# Patient Record
Sex: FEMALE | Race: WHITE | NOT HISPANIC OR LATINO | Employment: UNEMPLOYED | URBAN - METROPOLITAN AREA
[De-identification: names, ages, dates, MRNs, and addresses within clinical notes are randomized per-mention and may not be internally consistent; named-entity substitution may affect disease eponyms.]

---

## 2020-09-15 ENCOUNTER — TRANSCRIBE ORDERS (OUTPATIENT)
Dept: ADMINISTRATIVE | Facility: HOSPITAL | Age: 65
End: 2020-09-15

## 2020-09-15 DIAGNOSIS — R94.39 ABNORMAL RESULT OF OTHER CARDIOVASCULAR FUNCTION STUDY: Primary | ICD-10-CM

## 2020-09-21 ENCOUNTER — OUTPATIENT (OUTPATIENT)
Dept: OUTPATIENT SERVICES | Facility: HOSPITAL | Age: 65
LOS: 1 days | Discharge: HOME | End: 2020-09-21
Payer: MEDICARE

## 2020-09-21 DIAGNOSIS — R94.39 ABNORMAL RESULT OF OTHER CARDIOVASCULAR FUNCTION STUDY: ICD-10-CM

## 2020-09-21 PROCEDURE — 75574 CT ANGIO HRT W/3D IMAGE: CPT | Mod: 26

## 2020-10-08 ENCOUNTER — HOSPITAL ENCOUNTER (OUTPATIENT)
Dept: CT IMAGING | Facility: HOSPITAL | Age: 65
Discharge: HOME | End: 2020-10-08

## 2020-10-16 PROBLEM — Z00.00 ENCOUNTER FOR PREVENTIVE HEALTH EXAMINATION: Status: ACTIVE | Noted: 2020-10-16

## 2020-10-19 ENCOUNTER — APPOINTMENT (OUTPATIENT)
Dept: PULMONOLOGY | Facility: CLINIC | Age: 65
End: 2020-10-19
Payer: MEDICARE

## 2020-10-19 ENCOUNTER — LABORATORY RESULT (OUTPATIENT)
Age: 65
End: 2020-10-19

## 2020-10-19 VITALS
SYSTOLIC BLOOD PRESSURE: 116 MMHG | TEMPERATURE: 97.9 F | OXYGEN SATURATION: 96 % | WEIGHT: 191 LBS | DIASTOLIC BLOOD PRESSURE: 69 MMHG | HEART RATE: 94 BPM

## 2020-10-19 DIAGNOSIS — Z86.11 PERSONAL HISTORY OF TUBERCULOSIS: ICD-10-CM

## 2020-10-19 DIAGNOSIS — I10 ESSENTIAL (PRIMARY) HYPERTENSION: ICD-10-CM

## 2020-10-19 DIAGNOSIS — Z78.9 OTHER SPECIFIED HEALTH STATUS: ICD-10-CM

## 2020-10-19 DIAGNOSIS — Z83.3 FAMILY HISTORY OF DIABETES MELLITUS: ICD-10-CM

## 2020-10-19 DIAGNOSIS — E78.00 PURE HYPERCHOLESTEROLEMIA, UNSPECIFIED: ICD-10-CM

## 2020-10-19 DIAGNOSIS — E11.9 TYPE 2 DIABETES MELLITUS W/OUT COMPLICATIONS: ICD-10-CM

## 2020-10-19 PROCEDURE — 99203 OFFICE O/P NEW LOW 30 MIN: CPT

## 2020-10-19 RX ORDER — ROSUVASTATIN CALCIUM 40 MG/1
40 TABLET, FILM COATED ORAL
Qty: 90 | Refills: 0 | Status: ACTIVE | COMMUNITY
Start: 2019-10-12

## 2020-10-19 RX ORDER — METOPROLOL TARTRATE 50 MG/1
50 TABLET, FILM COATED ORAL
Qty: 2 | Refills: 0 | Status: ACTIVE | COMMUNITY
Start: 2020-09-16

## 2020-10-19 RX ORDER — OLMESARTAN MEDOXOMIL AND HYDROCHLOROTHIAZIDE 40; 25 MG/1; MG/1
40-25 TABLET ORAL
Qty: 90 | Refills: 0 | Status: ACTIVE | COMMUNITY
Start: 2020-10-07

## 2020-10-19 NOTE — HISTORY OF PRESENT ILLNESS
[Never] : never [Former] : former [TextBox_4] : 64yo with recent dx of DM, HTN, HLD, and CKD referred for evaluation of pulmonary issues. Has been having progressive SOB for about 2 years. Stopped smoking in 2018; 1 pack per day since age 15. Also had TB in 1976; DOT x 3 months; no contacts at that time. As part of workup for SOB she had a cardiac CT which showed some lung abnormalities. Thinks all of her issues are secondary to stress. Has 5 finches for at least 20 years. Has symptoms with minimal walking. 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 Is from [place] but no known environmental exposures from there. New dx of DM, CKD. Was told she has asthma; has been on Breo x 2 years with no difference. Never had PFTs or CT chest. No lung related hospitalizations. No history of recurrent pneumonias. [TextBox_13] : 42 [TextBox_11] : 1 [YearQuit] : 2018

## 2020-10-19 NOTE — PHYSICAL EXAM
[Normal Oropharynx] : normal oropharynx [No Acute Distress] : no acute distress [Normal Rate/Rhythm] : normal rate/rhythm [No Neck Mass] : no neck mass [Normal Appearance] : normal appearance [Normal S1, S2] : normal s1, s2 [No Murmurs] : no murmurs [Clear to Auscultation Bilaterally] : clear to auscultation bilaterally [No Abnormalities] : no abnormalities [No Resp Distress] : no resp distress [Benign] : benign [No Cyanosis] : no cyanosis [No Clubbing] : no clubbing [Normal Gait] : normal gait [FROM] : FROM [No Edema] : no edema [No Focal Deficits] : no focal deficits [Normal Color/ Pigmentation] : normal color/ pigmentation [Oriented x3] : oriented x3 [Normal Affect] : normal affect

## 2020-10-19 NOTE — ASSESSMENT
[FreeTextEntry1] : 66yo with recent dx of DM, HTN, HLD, and CKD referred for evaluation of pulmonary issues. Has been having progressive COLON for about 2 years. Never had a full workup. Cardiac workup led to a CTA which showed bronchial wall thickening, GGO, atelectasis, and minimal cardiac disease. CXR also showed BRAYDEN pleural thickening. Differential for Ms. Wu's COLON/SOB is wide including COPD versus HP (has birds for years) versus ILD versus non infectious pneumonias versus malignancy. She is also a former heavy smoker and is eligible for the lung cancer screening program. Further lung imaging is indicated given the abnormalities noted on the CXR and cardiac CTA. She was referred to Jigna [facility] for the LDCT scheduling and screening program enrollment. Will also get PFTs and 6MWT to assess baseline lung function. I will not adjust her bronchodilators as I am not sure of what the etiology of her COLON is.\par \par Of note Ms. Wu is indicated for sleep apnea testing given recent diagnosis of metabolic comorbidities. Will defer this discussion until the pulmonary workup is complete.\par \par Ms. Wu will follow up after the LDCT, PFTs, and 6MWT are resulted.

## 2020-10-20 ENCOUNTER — NON-APPOINTMENT (OUTPATIENT)
Age: 65
End: 2020-10-20

## 2020-10-21 ENCOUNTER — APPOINTMENT (OUTPATIENT)
Dept: PULMONOLOGY | Facility: CLINIC | Age: 65
End: 2020-10-21
Payer: MEDICARE

## 2020-10-21 VITALS — HEIGHT: 63 IN | BODY MASS INDEX: 33.84 KG/M2 | WEIGHT: 191 LBS

## 2020-10-21 PROCEDURE — G0296 VISIT TO DETERM LDCT ELIG: CPT

## 2020-10-21 NOTE — REASON FOR VISIT
[Home] : at home, [unfilled] , at the time of the visit. [Medical Office: ([facility])___] : at the medical office located in  [Verbal consent obtained from patient] : the patient, [unfilled] [Initial Evaluation] : an initial evaluation visit [Review of Eligibility] : review of eligibility [Low-Dose CT Screening Discussion] : low-dose CT lung cancer screening discussion

## 2020-10-21 NOTE — PLAN
[Smoking Cessation] : smoking cessation [FreeTextEntry1] : Plan:\par -Low Dose CT chest for lung cancer screening\par -Follow up with patient and her referring provider after her LDCT results have been reviewed by the multi-disciplinary clinical team\par -Encouraged continued smoking abstinence\par \par Engaged in shared decision making with Ms. CHIDI DOSS . Answered all questions. She verbalized understanding and agreement. She knows to call back with any questions or concerns

## 2020-10-21 NOTE — HISTORY OF PRESENT ILLNESS
[Former] : former smoker [_____ pack-years] : [unfilled] pack-years [TextBox_13] : Polish interpretation provided by granddaughter Katelyn\par \par Referred by Dr. Newton\par \par Ms. CHIDI DOSS  is a 65 year old woman with a history of HTN, COLON, DM, TB\par \par She  was seen in the office by Dr. Newton for review of eligibility for, as well as, discussion of Low-Dose CT lung cancer screening program. Over the telephone today we reviewed and confirmed that the patient meets screening eligibility criteria:\par -Age: 65 year \par Smoking status:\par -Former smoker\par -Number of pack(s) per day: 1\par -Number of year smoked: 48\par -Number of pack years smokin\par -Number of years since quitting smokin\par -Quit year: \par \par Had TB 45 years ago 3 mos in the hospital in [place]. Likely BRAYDEN\par She has 6 finches as pets\par \par Ms. DOSS denies any signs or symptoms of lung cancer including new cough, change in cough, hemoptysis and unintentional weight loss. \par \par Ms. DOSS denies any personal history of lung cancer. No lung cancer in a 1st degree relative. Denies any history of lung disease. 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

## 2020-10-22 ENCOUNTER — NON-APPOINTMENT (OUTPATIENT)
Age: 65
End: 2020-10-22

## 2020-10-22 ENCOUNTER — APPOINTMENT (OUTPATIENT)
Dept: CT IMAGING | Facility: CLINIC | Age: 65
End: 2020-10-22
Payer: MEDICARE

## 2020-10-22 ENCOUNTER — RESULT REVIEW (OUTPATIENT)
Age: 65
End: 2020-10-22

## 2020-10-22 ENCOUNTER — APPOINTMENT (OUTPATIENT)
Dept: PULMONOLOGY | Facility: CLINIC | Age: 65
End: 2020-10-22
Payer: MEDICARE

## 2020-10-22 ENCOUNTER — OUTPATIENT (OUTPATIENT)
Dept: OUTPATIENT SERVICES | Facility: HOSPITAL | Age: 65
LOS: 1 days | End: 2020-10-22

## 2020-10-22 PROCEDURE — 94726 PLETHYSMOGRAPHY LUNG VOLUMES: CPT

## 2020-10-22 PROCEDURE — 94060 EVALUATION OF WHEEZING: CPT

## 2020-10-22 PROCEDURE — 94618 PULMONARY STRESS TESTING: CPT

## 2020-10-22 PROCEDURE — G0297: CPT | Mod: 26

## 2020-10-22 PROCEDURE — 94729 DIFFUSING CAPACITY: CPT

## 2020-10-25 ENCOUNTER — TRANSCRIPTION ENCOUNTER (OUTPATIENT)
Age: 65
End: 2020-10-25

## 2020-10-26 ENCOUNTER — NON-APPOINTMENT (OUTPATIENT)
Age: 65
End: 2020-10-26

## 2020-10-28 ENCOUNTER — APPOINTMENT (OUTPATIENT)
Dept: PULMONOLOGY | Facility: CLINIC | Age: 65
End: 2020-10-28
Payer: MEDICARE

## 2020-10-28 PROCEDURE — 99443: CPT | Mod: 95

## 2020-10-28 RX ORDER — FUROSEMIDE 20 MG/1
20 TABLET ORAL
Qty: 30 | Refills: 0 | Status: ACTIVE | COMMUNITY
Start: 2020-10-20

## 2020-10-28 RX ORDER — NYSTATIN 100000 [USP'U]/ML
100000 SUSPENSION ORAL
Qty: 480 | Refills: 0 | Status: ACTIVE | COMMUNITY
Start: 2020-07-07

## 2020-10-28 RX ORDER — MECLIZINE HCL 25 MG/1
25 TABLET, CHEWABLE ORAL
Qty: 90 | Refills: 0 | Status: ACTIVE | COMMUNITY
Start: 2020-07-01

## 2020-10-28 NOTE — HISTORY OF PRESENT ILLNESS
[Home] : at home, [unfilled] , at the time of the visit. [Medical Office: ([facility])___] : at the medical office located in  [Former] : former [Never] : never [TextBox_4] : 64yo with recent dx of DM, HTN, HLD, and CKD referred for evaluation of pulmonary issues. Has been having progressive SOB for about 2 years. Stopped smoking in 2018; 1 pack per day since age 15. Also had TB in 1976; DOT x 3 months; no contacts at that time. As part of workup for SOB she had a cardiac CT which showed some lung abnormalities. Thinks all of her issues are secondary to stress. Has 5 finches for at least 20 years. Has symptoms with minimal walking. 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 Is from [place] but no known environmental exposures from there. New dx of DM, CKD. Was told she has asthma; has been on Breo x 2 years with no difference. Never had PFTs or CT chest. No lung related hospitalizations. No history of recurrent pneumonias.\par \par 10/28/2020\par Following up after CT chest and PFTs. [TextBox_11] : 1 [TextBox_13] : 42 [YearQuit] : 2018 [Lung Cancer Screening] : Patient underwent lung cancer screening [3] : 3 [TextBox_12] : 10/2020

## 2020-10-28 NOTE — ASSESSMENT
[FreeTextEntry1] : 66yo with recent dx of DM, HTN, HLD, and CKD. hx of treated TB as a child. Has COLON and SOB. PFTs showed severe obstructive airway disease with mild reduction in DLCO. No desaturation noted on 6MWT but reduced walk distance. LDCT showed a BRAYDEN opacity with bronchiectasis/bronchial wall thickening/apical scarring (Lung RADS 3): this may be secondary to residual tuberculous disease versus FREDERICK versus COPD (most likely all three). Will start with ICS/LABA 2puffs BID; granddaughter will show proper technique to the patient. Will need to repeat LDCT in 3 months to ensure stability of BRAYDEN opactity. At next follow up, in 4 weeks, will check IgE, CBC/diff, and HP panel.

## 2020-10-28 NOTE — PROCEDURE
[FreeTextEntry1] : Compared to coronary CT, 9/2020\par LDCT from 10/22/2020:\par *bronchiectasis\par *peribronchial wall thickening\par *BRAYDEN opacity\par *micronodular opacity in BRAYDEN\par *biapical pleural, BRAYDEN/lingula/LLL scarring vs atelectasis\par *granuloma in lingula\par \par PFTs from 10/22/2020\par FEV1 48\par FEV1/FVC 54\par \par DLCO 67\par *severe obstruction, hyperinflation, and mild reduction in diffusion\par \par 6MWT from 10/22/2020\par pre/post HR 94/112\par pre/post saturation 95/94% \par meters walked 180\par *reduced walk distance

## 2020-10-30 ENCOUNTER — TRANSCRIPTION ENCOUNTER (OUTPATIENT)
Age: 65
End: 2020-10-30

## 2020-10-30 RX ORDER — FLUTICASONE PROPIONATE AND SALMETEROL 250; 50 UG/1; UG/1
250-50 POWDER RESPIRATORY (INHALATION)
Qty: 1 | Refills: 0 | Status: DISCONTINUED | COMMUNITY
Start: 2020-10-28 | End: 2020-10-30

## 2020-11-02 RX ORDER — FLUTICASONE FUROATE AND VILANTEROL TRIFENATATE 200; 25 UG/1; UG/1
200-25 POWDER RESPIRATORY (INHALATION)
Qty: 1 | Refills: 3 | Status: ACTIVE | COMMUNITY
Start: 2020-10-30 | End: 1900-01-01

## 2020-11-11 ENCOUNTER — TRANSCRIPTION ENCOUNTER (OUTPATIENT)
Age: 65
End: 2020-11-11

## 2020-12-15 ENCOUNTER — TRANSCRIPTION ENCOUNTER (OUTPATIENT)
Age: 65
End: 2020-12-15

## 2021-05-04 ENCOUNTER — APPOINTMENT (OUTPATIENT)
Dept: CT IMAGING | Facility: CLINIC | Age: 66
End: 2021-05-04

## 2021-06-24 ENCOUNTER — OUTPATIENT (OUTPATIENT)
Dept: OUTPATIENT SERVICES | Facility: HOSPITAL | Age: 66
LOS: 1 days | End: 2021-06-24
Payer: MEDICARE

## 2021-06-24 ENCOUNTER — APPOINTMENT (OUTPATIENT)
Dept: CT IMAGING | Facility: HOSPITAL | Age: 66
End: 2021-06-24

## 2021-06-24 ENCOUNTER — RESULT REVIEW (OUTPATIENT)
Age: 66
End: 2021-06-24

## 2021-06-24 PROCEDURE — 71250 CT THORAX DX C-: CPT

## 2021-06-24 PROCEDURE — 71250 CT THORAX DX C-: CPT | Mod: 26,MH

## 2021-06-29 ENCOUNTER — NON-APPOINTMENT (OUTPATIENT)
Age: 66
End: 2021-06-29

## 2021-07-16 ENCOUNTER — APPOINTMENT (OUTPATIENT)
Dept: PULMONOLOGY | Facility: CLINIC | Age: 66
End: 2021-07-16
Payer: MEDICARE

## 2021-07-16 PROCEDURE — 99443: CPT | Mod: 95

## 2021-07-16 NOTE — HISTORY OF PRESENT ILLNESS
[Home] : at home, [unfilled] , at the time of the visit. [Medical Office: ([facility])___] : at the medical office located in  [Verbal consent obtained from patient] : the patient, [unfilled] [Former] : former [Never] : never [Lung Cancer Screening] : Patient underwent lung cancer screening [3] : 3 [TextBox_4] : 66yo with recent dx of DM, HTN, HLD, and CKD referred for evaluation of pulmonary issues. Has been having progressive SOB for about 2 years. Stopped smoking in 2018; 1 pack per day since age 15. Also had TB in 1976; DOT x 3 months; no contacts at that time. As part of workup for SOB she had a cardiac CT which showed some lung abnormalities. Thinks all of her issues are secondary to stress. Has 5 finches for at least 20 years. Has symptoms with minimal walking. 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 Is from [place] but no known environmental exposures from there. New dx of DM, CKD. Was told she has asthma; has been on Breo x 2 years with no difference. Never had PFTs or CT chest. No lung related hospitalizations. No history of recurrent pneumonias.\par \par 10/28/2020\par Following up after CT chest and PFTs.\par \par 7/16/2021\par Spoke to granddaughter. Had LDCT as part of lung cancer screening program. Doing okay. Using inhalers as needed. No exacerbations.  [TextBox_11] : 1 [TextBox_13] : 42 [YearQuit] : 2018 [2] : 2 [TextBox_12] : 10/2020 [TextBox_27] : 6/2021

## 2021-10-06 PROBLEM — I10 ESSENTIAL HYPERTENSION: Status: ACTIVE | Noted: 2020-10-19

## 2022-07-08 ENCOUNTER — HOSPITAL ENCOUNTER (OUTPATIENT)
Dept: CT IMAGING | Facility: CLINIC | Age: 67
Discharge: HOME | End: 2022-07-08
Payer: MEDICARE

## 2022-07-08 DIAGNOSIS — J44.9 CHRONIC OBSTRUCTIVE PULMONARY DISEASE, UNSPECIFIED (HCC): ICD-10-CM

## 2022-07-08 PROCEDURE — 71271 CT THORAX LUNG CANCER SCR C-: CPT

## 2022-07-11 ENCOUNTER — APPOINTMENT (OUTPATIENT)
Dept: ULTRASOUND IMAGING | Facility: HOSPITAL | Age: 67
End: 2022-07-11

## 2022-07-11 ENCOUNTER — OUTPATIENT (OUTPATIENT)
Dept: OUTPATIENT SERVICES | Facility: HOSPITAL | Age: 67
LOS: 1 days | End: 2022-07-11
Payer: MEDICARE

## 2022-07-11 PROCEDURE — 76604 US EXAM CHEST: CPT

## 2022-07-11 PROCEDURE — 76604 US EXAM CHEST: CPT | Mod: 26

## 2022-07-22 ENCOUNTER — TRANSCRIPTION ENCOUNTER (OUTPATIENT)
Age: 67
End: 2022-07-22

## 2022-07-29 ENCOUNTER — OUTPATIENT (OUTPATIENT)
Dept: OUTPATIENT SERVICES | Facility: HOSPITAL | Age: 67
LOS: 1 days | End: 2022-07-29
Payer: MEDICARE

## 2022-07-29 ENCOUNTER — APPOINTMENT (OUTPATIENT)
Dept: NUCLEAR MEDICINE | Facility: HOSPITAL | Age: 67
End: 2022-07-29

## 2022-07-29 LAB — GLUCOSE BLDC GLUCOMTR-MCNC: 99 MG/DL — SIGNIFICANT CHANGE UP (ref 70–99)

## 2022-07-29 PROCEDURE — 82962 GLUCOSE BLOOD TEST: CPT

## 2022-07-29 PROCEDURE — 78815 PET IMAGE W/CT SKULL-THIGH: CPT | Mod: 26,PI,MH

## 2022-07-29 PROCEDURE — A9552: CPT

## 2022-07-29 PROCEDURE — 78815 PET IMAGE W/CT SKULL-THIGH: CPT

## 2022-08-02 ENCOUNTER — TRANSCRIPTION ENCOUNTER (OUTPATIENT)
Age: 67
End: 2022-08-02

## 2022-08-02 DIAGNOSIS — J18.9 PNEUMONIA, UNSPECIFIED ORGANISM: ICD-10-CM

## 2022-08-02 RX ORDER — AZITHROMYCIN 250 MG/1
250 TABLET, FILM COATED ORAL
Qty: 1 | Refills: 0 | Status: ACTIVE | COMMUNITY
Start: 2022-08-02 | End: 1900-01-01

## 2022-09-02 ENCOUNTER — APPOINTMENT (OUTPATIENT)
Dept: PULMONOLOGY | Facility: CLINIC | Age: 67
End: 2022-09-02

## 2022-09-02 DIAGNOSIS — Z87.891 PERSONAL HISTORY OF NICOTINE DEPENDENCE: ICD-10-CM

## 2022-09-02 DIAGNOSIS — R91.1 SOLITARY PULMONARY NODULE: ICD-10-CM

## 2022-09-02 PROCEDURE — 99443: CPT | Mod: 95

## 2022-09-02 NOTE — HISTORY OF PRESENT ILLNESS
[Former] : former [Never] : never [Lung Cancer Screening] : Patient underwent lung cancer screening [3] : 3 [2] : 2 [Home] : at home, [unfilled] , at the time of the visit. [Medical Office: ([facility])___] : at the medical office located in  [TextBox_4] : 66yo with recent dx of DM, HTN, HLD, and CKD referred for evaluation of pulmonary issues. Has been having progressive SOB for about 2 years. Stopped smoking in 2018; 1 pack per day since age 15. Also had TB in 1976; DOT x 3 months; no contacts at that time. As part of workup for SOB she had a cardiac CT which showed some lung abnormalities. Thinks all of her issues are secondary to stress. Has 5 finches for at least 20 years. Has symptoms with minimal walking. 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 Is from [place] but no known environmental exposures from there. New dx of DM, CKD. Was told she has asthma; has been on Breo x 2 years with no difference. Never had PFTs or CT chest. No lung related hospitalizations. No history of recurrent pneumonias.\par \par 10/28/2020\par Following up after CT chest and PFTs.\par \par 7/16/2021\par Spoke to granddaughter. Had LDCT as part of lung cancer screening program. Doing okay. Using inhalers as needed. No exacerbations. \par \par 9/2/2022\par Would like to discuss PET/CT. Generally has been feeling well. [TextBox_11] : 1 [TextBox_13] : 42 [YearQuit] : 2018 [ESS] : 3 [TextBox_12] : 10/2020 [TextBox_27] : 6/2021 [TextBox_57] : PET/CT 7/29/2022

## 2022-09-02 NOTE — ASSESSMENT
[FreeTextEntry1] : \par REVIEWED\par 1. LDCT Short term follow up 6/24/2021\par compared to 10/22/2020\par No change in BRAYDEN opacity\par Lung RADS2\par 2.  PET/CT 7/2022: new FDG avid pleural apical scarring; RLL scarring; RML and RUL TIBs, new FDG avid 11x13 part solid RML nodule\par \par \par \par 66yo withDM, HTN, HLD, and CKD. hx of treated TB as a child. Has severe obstructive airway disease with mild reduction in DLCO. No desaturation noted on 6MWT but reduced walk distance. LDCT in 10/2020 showed a BRAYDEN opacity with bronchiectasis/bronchial wall thickening/apical scarring (Lung RADS 3): repeat short term LDCT follow up in 6/2021 with no change in the BRAYDEN opacity. As per guidelines and multidisciplinary team discussion follow up in 1 year in July 2022 was recommended. Her PCP did a CT chest at an outside facility in mid May/June which showed new nodules (report not obtained; CD imaging not obtained but is pending as per the granddaughter) and this led to a PET/CT which showed a new RML part solid nodule which was not present on June 2021 imaging. We opted for a 6 week imaging follow up and a short course of Zpack treatment. Six week follow up point will be in middle of September. Follow up after imaging is done. \par

## 2022-10-26 ENCOUNTER — NON-APPOINTMENT (OUTPATIENT)
Age: 67
End: 2022-10-26

## 2022-10-26 VITALS — WEIGHT: 191 LBS | HEIGHT: 63 IN | BODY MASS INDEX: 33.84 KG/M2

## 2022-10-26 DIAGNOSIS — Z87.891 PERSONAL HISTORY OF NICOTINE DEPENDENCE: ICD-10-CM

## 2022-10-26 NOTE — REASON FOR VISIT
[Home] : at home, [unfilled] , at the time of the visit. [Medical Office: ([facility])___] : at the medical office located in  [Verbal consent obtained from patient] : the patient, [unfilled] [Annual Follow-Up] : an annual follow-up visit [Review of Eligibility] : review of eligibility [Low-Dose CT Screening Discussion] : low-dose CT lung cancer screening discussion [Virtual Visit] : virtual visit

## 2022-10-26 NOTE — HISTORY OF PRESENT ILLNESS
[Former] : Former [TextBox_13] : Polish interpretation provided by granddaughter Katelyn\par \par Referred by Dr. Newton\par \par Ms. CHIDI DOSS is a 67 year old woman with a history of HTN, COLON, DM, TB\par \par She was seen in the office by Dr. Newton for review of eligibility for, as well as, discussion of Low-Dose CT lung cancer screening program. Over the telephone today we reviewed and confirmed that the patient meets screening eligibility criteria:\par -Age: 67 year \par Smoking status:\par -Former smoker\par -Number of pack(s) per day: 1\par -Number of year smoked: 48\par -Number of pack years smokin\par -Number of years since quitting smokin\par -Quit year: \par \par Had  years ago 3 mos in the hospital in [place]. Likely BRAYDEN\par She has 6 finches as pets\par \par Ms. DOSS denies any signs or symptoms of lung cancer including new cough, change in cough, hemoptysis and unintentional weight loss. \par \par Ms. DOSS denies any personal history of lung cancer. No lung cancer in a 1st degree relative. Denies any history of lung disease. 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

## 2022-10-26 NOTE — PLAN
[Smoking Cessation] : smoking cessation [Regular follow-up with healthcare provider] : regular follow-up with healthcare provider [FreeTextEntry1] : Plan:\par -Low Dose CT chest for lung cancer screening\par -Follow up with patient and her referring provider after her LDCT results have been reviewed by the multi-disciplinary clinical team\par -Encouraged continued smoking abstinence\par \par Engaged in shared decision making with Ms. CHIDI DOSS . Answered all questions. She verbalized understanding and agreement. She knows to call back with any questions or concerns

## 2022-11-08 ENCOUNTER — RESULT REVIEW (OUTPATIENT)
Age: 67
End: 2022-11-08

## 2022-11-08 ENCOUNTER — APPOINTMENT (OUTPATIENT)
Dept: CT IMAGING | Facility: CLINIC | Age: 67
End: 2022-11-08

## 2022-11-08 ENCOUNTER — OUTPATIENT (OUTPATIENT)
Dept: OUTPATIENT SERVICES | Facility: HOSPITAL | Age: 67
LOS: 1 days | End: 2022-11-08

## 2022-11-08 PROCEDURE — 71271 CT THORAX LUNG CANCER SCR C-: CPT | Mod: 26

## 2022-11-09 ENCOUNTER — NON-APPOINTMENT (OUTPATIENT)
Age: 67
End: 2022-11-09

## 2022-11-11 ENCOUNTER — NON-APPOINTMENT (OUTPATIENT)
Age: 67
End: 2022-11-11

## 2022-11-18 ENCOUNTER — APPOINTMENT (OUTPATIENT)
Dept: PULMONOLOGY | Facility: CLINIC | Age: 67
End: 2022-11-18

## 2022-11-18 DIAGNOSIS — J44.9 CHRONIC OBSTRUCTIVE PULMONARY DISEASE, UNSPECIFIED: ICD-10-CM

## 2022-11-18 DIAGNOSIS — R06.09 OTHER FORMS OF DYSPNEA: ICD-10-CM

## 2022-11-18 PROCEDURE — 99443: CPT | Mod: 95

## 2022-11-18 NOTE — HISTORY OF PRESENT ILLNESS
[Home] : at home, [unfilled] , at the time of the visit. [Medical Office: ([facility])___] : at the medical office located in  [Verbal consent obtained from patient] : the patient, [unfilled] [Former] : former [Never] : never [TextBox_4] : 64yo with recent dx of DM, HTN, HLD, and CKD referred for evaluation of pulmonary issues. Has been having progressive SOB for about 2 years. Stopped smoking in 2018; 1 pack per day since age 15. Also had TB in 1976; DOT x 3 months; no contacts at that time. As part of workup for SOB she had a cardiac CT which showed some lung abnormalities. Thinks all of her issues are secondary to stress. Has 5 finches for at least 20 years. Has symptoms with minimal walking. 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 Is from [place] but no known environmental exposures from there. New dx of DM, CKD. Was told she has asthma; has been on Breo x 2 years with no difference. Never had PFTs or CT chest. No lung related hospitalizations. No history of recurrent pneumonias.\par \par 10/28/2020\par Following up after CT chest and PFTs.\par \par 7/16/2021\par Spoke to granddaughter. Had LDCT as part of lung cancer screening program. Doing okay. Using inhalers as needed. No exacerbations. \par \par 9/2/2022\par Would like to discuss PET/CT. Generally has been feeling well.\par \par 11/18/2022\par Has been okay. Had LDCT. Not using inhaler.  [TextBox_11] : 1 [TextBox_13] : 42 [YearQuit] : 2018 [ESS] : 3 [Lung Cancer Screening] : Patient underwent lung cancer screening [3] : 3 [2] : 2 [S] : S [TextBox_12] : 10/2020 [TextBox_27] : 6/2021 [TextBox_42] : 11/2022 [TextBox_57] : PET/CT 7/29/2022

## 2022-11-18 NOTE — ASSESSMENT
[FreeTextEntry1] : REVIEWED\par 1. LDCT Short term follow up 6/24/2021\par compared to 10/22/2020\par No change in BRAYDEN opacity\par Lung RADS2\par 2. PET/CT 7/2022: new FDG avid pleural apical scarring; RLL scarring; RML and RUL TIBs, new FDG avid 11x13 part solid RML nodule\par 3. LDCT 11/2022, compared to 2020, 2021\par pleural scarring in upper lobe, L > R\par traction bronchiectasis in BRAYDEN\par lingular TIBs\par Lung RADS 2S\par \par \par 64yo withDM, HTN, HLD, and CKD. hx of treated TB as a child. Per most recent PFTs in 2020 has severe obstructive airway disease with mild reduction in DLCO. No desaturation noted on 6MWT but reduced walk distance. LDCT in 10/2020 showed a BRAYDEN opacity with bronchiectasis/bronchial wall thickening/apical scarring (Lung RADS 3): repeat short term LDCT follow up in 6/2021 with no change in the BRAYDEN opacity. As per guidelines and multidisciplinary team discussion follow up in 1 year in July 2022 was recommended. Her PCP did a CT chest at an outside facility in mid May/June 2022 which showed new nodules (report not obtained; CD imaging not obtained but is pending as per the granddaughter) and this led to a PET/CT which showed a new RML part solid nodule which was not present on June 2021 imaging. We opted for a 6 week imaging follow up and a short course of Zpack treatment. Had a repeat CT chest in Nov 2022 with stable disease; due for annual LDCT in Nov 2023. Should get repeat PFTs and resume inhaler. Follow up in 3 months.

## 2023-09-26 NOTE — ASSESSMENT
[FreeTextEntry1] : REVIEWED\par LDCT Short term follow up 6/24/2021\par compared to 10/22/2020\par No change in BRAYDEN opacity\par Lung RADS2\par \par \par 66yo withDM, HTN, HLD, and CKD. hx of treated TB as a child. Has severe obstructive airway disease with mild reduction in DLCO. No desaturation noted on 6MWT but reduced walk distance. LDCT in 10/2020 showed a BRAYDEN opacity with bronchiectasis/bronchial wall thickening/apical scarring (Lung RADS 3): repeat short term LDCT follow up  in 6/2021 with no change in the BRAYDEN opacity. As per guidelines and multidisciplinary team discussion follow up in 1 year is appropriate as part of the screening program. C/w ICS/LABA. Should check serologies including IgE, CBC, HP panel at next visit but lives in the [facility] so difficult for her to travel to [place]. Follow up in 3 months.  Adbry Pregnancy And Lactation Text: It is unknown if this medication will adversely affect pregnancy or breast feeding.

## 2024-07-22 ENCOUNTER — HOSPITAL ENCOUNTER (OUTPATIENT)
Dept: ULTRASOUND IMAGING | Facility: HOSPITAL | Age: 69
Discharge: HOME | End: 2024-07-22
Payer: MEDICARE

## 2024-07-22 DIAGNOSIS — N18.4 CHRONIC KIDNEY DISEASE, STAGE 4 (SEVERE) (HCC): ICD-10-CM

## 2024-07-22 PROCEDURE — 76700 US EXAM ABDOM COMPLETE: CPT

## 2024-10-02 ENCOUNTER — APPOINTMENT (OUTPATIENT)
Dept: PULMONOLOGY | Facility: CLINIC | Age: 69
End: 2024-10-02

## 2024-10-16 ENCOUNTER — HOSPITAL ENCOUNTER (OUTPATIENT)
Dept: RADIOLOGY | Facility: HOSPITAL | Age: 69
Discharge: HOME | End: 2024-10-16
Payer: MEDICARE

## 2024-10-16 DIAGNOSIS — R05.1 ACUTE COUGH: ICD-10-CM

## 2024-10-16 DIAGNOSIS — R06.02 SHORTNESS OF BREATH: ICD-10-CM

## 2024-10-16 PROCEDURE — 71046 X-RAY EXAM CHEST 2 VIEWS: CPT

## 2025-05-13 NOTE — DATA REVIEWED
[2] : 2 [TextBox_42] : 06/21 [Postmenopausal] : The patient is postmenopausal [Menarche Age ____] : age at menarche was [unfilled] [Menopause Age____] : age at menopause was [unfilled] [Total Preg ___] : G[unfilled] [Live Births ___] : P[unfilled]  [Living ___] : Living: [unfilled] [AB Induced ___] : elective abortions: [unfilled]  [de-identified] : 47 yo